# Patient Record
Sex: FEMALE | Race: WHITE | NOT HISPANIC OR LATINO | URBAN - NONMETROPOLITAN AREA
[De-identification: names, ages, dates, MRNs, and addresses within clinical notes are randomized per-mention and may not be internally consistent; named-entity substitution may affect disease eponyms.]

---

## 2017-08-31 ENCOUNTER — OFFICE VISIT (OUTPATIENT)
Dept: RETAIL CLINIC | Facility: CLINIC | Age: 37
End: 2017-08-31

## 2017-08-31 VITALS
OXYGEN SATURATION: 97 % | RESPIRATION RATE: 18 BRPM | HEART RATE: 89 BPM | BODY MASS INDEX: 43.28 KG/M2 | WEIGHT: 259.8 LBS | HEIGHT: 65 IN | TEMPERATURE: 98.4 F

## 2017-08-31 DIAGNOSIS — J01.01 ACUTE RECURRENT MAXILLARY SINUSITIS: Primary | ICD-10-CM

## 2017-08-31 PROCEDURE — 99213 OFFICE O/P EST LOW 20 MIN: CPT | Performed by: NURSE PRACTITIONER

## 2017-08-31 RX ORDER — IBUPROFEN 200 MG
200 TABLET ORAL EVERY 6 HOURS PRN
COMMUNITY

## 2017-08-31 RX ORDER — DIPHENHYDRAMINE HCL 50 MG
50 CAPSULE ORAL EVERY 6 HOURS PRN
COMMUNITY

## 2017-08-31 RX ORDER — AMOXICILLIN AND CLAVULANATE POTASSIUM 875; 125 MG/1; MG/1
1 TABLET, FILM COATED ORAL 2 TIMES DAILY
Qty: 20 TABLET | Refills: 0 | Status: SHIPPED | OUTPATIENT
Start: 2017-08-31 | End: 2017-09-10

## 2017-08-31 RX ORDER — FLUTICASONE PROPIONATE 50 MCG
2 SPRAY, SUSPENSION (ML) NASAL DAILY
Qty: 1 BOTTLE | Refills: 0 | Status: SHIPPED | OUTPATIENT
Start: 2017-08-31 | End: 2017-09-30

## 2018-06-01 ENCOUNTER — OFFICE VISIT (OUTPATIENT)
Dept: RETAIL CLINIC | Facility: CLINIC | Age: 38
End: 2018-06-01

## 2018-06-01 VITALS
RESPIRATION RATE: 18 BRPM | OXYGEN SATURATION: 98 % | TEMPERATURE: 99 F | WEIGHT: 251.6 LBS | HEART RATE: 93 BPM | BODY MASS INDEX: 41.87 KG/M2

## 2018-06-01 DIAGNOSIS — S05.8X2A: Primary | ICD-10-CM

## 2018-06-01 PROCEDURE — 99213 OFFICE O/P EST LOW 20 MIN: CPT | Performed by: NURSE PRACTITIONER

## 2018-06-01 RX ORDER — CEPHALEXIN 500 MG/1
500 CAPSULE ORAL 2 TIMES DAILY
Qty: 20 CAPSULE | Refills: 0 | Status: SHIPPED | OUTPATIENT
Start: 2018-06-01 | End: 2018-06-11

## 2018-06-01 RX ORDER — ERYTHROMYCIN 5 MG/G
OINTMENT OPHTHALMIC
Qty: 1 EACH | Refills: 0 | Status: SHIPPED | OUTPATIENT
Start: 2018-06-01

## 2018-06-01 NOTE — PROGRESS NOTES
"Konstantin Carrillo is a 38 y.o. female.     Chief Complaint   Patient presents with   • Abrasion     near left eye      Abrasion   This is a new problem. The current episode started in the past 7 days. The problem has been gradually worsening. Pertinent negatives include no chills, congestion, fatigue, fever, headaches, nausea, neck pain, rash, sore throat, swollen glands or visual change. Nothing aggravates the symptoms. Treatments tried: applying OTC antibiotic ointment  The treatment provided no relief.    She reports the area initial onset as a small crusted area. She recalls being outdoors with debri from lawn mowers blowing in her face prior to the onset approximately 1 week ago. She has non recall of injury to the face. She has had concerns of \"shingles\" and has had the eye looked at by ophthalmologist today with no suspension of shingles. She denies any eye pain, vision changes or drainage from the left eye.    The following portions of the patient's history were reviewed and updated as appropriate: allergies, current medications, past family history, past medical history, past social history, past surgical history and problem list.    Current Outpatient Prescriptions:   •  cephalexin (KEFLEX) 500 MG capsule, Take 1 capsule by mouth 2 (Two) Times a Day for 10 days., Disp: 20 capsule, Rfl: 0  •  diphenhydrAMINE (BENADRYL) 50 MG capsule, Take 50 mg by mouth Every 6 (Six) Hours As Needed for Itching., Disp: , Rfl:   •  erythromycin (ROMYCIN) 5 MG/GM ophthalmic ointment, Apply a 1/2 inch ribbon of ointment to the affected eye four times daily for 7 days., Disp: 1 each, Rfl: 0  •  ibuprofen (ADVIL,MOTRIN) 200 MG tablet, Take 200 mg by mouth Every 6 (Six) Hours As Needed for Mild Pain ., Disp: , Rfl:     Pulse 93   Temp 99 °F (37.2 °C)   Resp 18   Wt 114 kg (251 lb 9.6 oz)   SpO2 98%   BMI 41.87 kg/m²     Review of Systems   Constitutional: Negative for chills, fatigue and fever.   HENT: " Negative for congestion and sore throat.    Eyes: Negative for photophobia, pain, discharge, redness, itching and visual disturbance.   Gastrointestinal: Negative for nausea.   Musculoskeletal: Negative for neck pain.   Skin: Negative for color change, pallor and rash. Wound: abrasion near the left inner eye.   Neurological: Negative for headaches.     Allergies   Allergen Reactions   • Latex Other (See Comments)     Burning Sensation     Physical Exam   Constitutional: She is oriented to person, place, and time. She appears well-developed and well-nourished.   HENT:   Head: Normocephalic.   Right Ear: Tympanic membrane normal.   Left Ear: Tympanic membrane normal.   Nose: Mucosal edema and rhinorrhea present.   Mouth/Throat: No uvula swelling. No posterior oropharyngeal edema or posterior oropharyngeal erythema.   Eyes: Conjunctivae, EOM and lids are normal. Pupils are equal, round, and reactive to light. Right eye exhibits no discharge, no exudate and no hordeolum. No foreign body present in the right eye. Left eye exhibits no discharge, no exudate and no hordeolum. No foreign body present in the left eye.       Cardiovascular: Normal rate and regular rhythm.    Pulmonary/Chest: Effort normal and breath sounds normal. She has no wheezes.   Lymphadenopathy:     She has no cervical adenopathy.   Neurological: She is alert and oriented to person, place, and time.   Skin: Skin is warm.   Psychiatric: She has a normal mood and affect. Her behavior is normal.      Assessment/Plan     Angie was seen today for abrasion.    Diagnoses and all orders for this visit:    Abrasion of eye region, left, initial encounter  -     cephalexin (KEFLEX) 500 MG capsule; Take 1 capsule by mouth 2 (Two) Times a Day for 10 days.  -     erythromycin (ROMYCIN) 5 MG/GM ophthalmic ointment; Apply a 1/2 inch ribbon of ointment to the affected eye four times daily for 7 days.         Discussed impression and treatment plan. Recommend f/u with  PCP within next several day to assess healing. Keep area clean and dry.   Follow up at the Urgent Care if eye symptoms occur or the wound worsen or fail to improve.  Patient teaching information discussed and provided to the patient. Patient verbalized understanding.      June 1, 2018 1:24 PM

## 2018-06-01 NOTE — PATIENT INSTRUCTIONS
Abrasion  An abrasion is a cut or scrape on the surface of your skin. An abrasion does not go through all of the layers of your skin. It is important to take good care of your abrasion to prevent infection.  Follow these instructions at home:  Medicines   · Take or apply medicines only as told by your doctor.  · If you were prescribed an antibiotic ointment, finish all of it even if you start to feel better.  Wound care   · Clean the wound with mild soap and water 2-3 times per day or as told by your doctor. Pat your wound dry with a clean towel. Do not rub it.  · There are many ways to close and cover a wound. Follow instructions from your doctor about:  ¨ How to take care of your wound.  ¨ When and how you should change your bandage (dressing).  ¨ When and how you should take off your dressing.  · Check your wound every day for signs of infection. Watch for:  ¨ Redness, swelling, or pain.  ¨ Fluid, blood, or pus.  General instructions   · Keep the dressing dry as told by your doctor. Do not take baths, swim, use a hot tub, or do anything that would put your wound underwater until your doctor says it is okay.  · If there is swelling, raise (elevate) the injured area above the level of your heart while you are sitting or lying down.  · Keep all follow-up visits as told by your doctor. This is important.  Contact a doctor if:  · You were given a tetanus shot and you have any of these where the needle went in:  ¨ Swelling.  ¨ Very bad pain.  ¨ Redness.  ¨ Bleeding.  · Medicine does not help your pain.  · You have any of these at the site of the wound:  ¨ More redness.  ¨ More swelling.  ¨ More pain.  Get help right away if:  · You have a red streak going away from your wound.  · You have a fever.  · You have fluid, blood, or pus coming from your wound.  · There is a bad smell coming from your wound.  This information is not intended to replace advice given to you by your health care provider. Make sure you discuss any  questions you have with your health care provider.  Document Released: 06/05/2009 Document Revised: 05/25/2017 Document Reviewed: 12/16/2015  [employer] Patient Education © 2017 [employer]